# Patient Record
Sex: MALE | Race: WHITE
[De-identification: names, ages, dates, MRNs, and addresses within clinical notes are randomized per-mention and may not be internally consistent; named-entity substitution may affect disease eponyms.]

---

## 2018-02-28 ENCOUNTER — HOSPITAL ENCOUNTER (INPATIENT)
Dept: HOSPITAL 14 - H.ER | Age: 83
LOS: 2 days | Discharge: ANOTHER HOSPITAL | DRG: 836 | End: 2018-03-02
Attending: INTERNAL MEDICINE | Admitting: INTERNAL MEDICINE
Payer: MEDICARE

## 2018-02-28 DIAGNOSIS — E78.5: ICD-10-CM

## 2018-02-28 DIAGNOSIS — N40.0: ICD-10-CM

## 2018-02-28 DIAGNOSIS — E11.9: ICD-10-CM

## 2018-02-28 DIAGNOSIS — E78.00: ICD-10-CM

## 2018-02-28 DIAGNOSIS — Z95.5: ICD-10-CM

## 2018-02-28 DIAGNOSIS — Z79.82: ICD-10-CM

## 2018-02-28 DIAGNOSIS — D64.9: ICD-10-CM

## 2018-02-28 DIAGNOSIS — D69.6: ICD-10-CM

## 2018-02-28 DIAGNOSIS — Z79.84: ICD-10-CM

## 2018-02-28 DIAGNOSIS — C91.00: Primary | ICD-10-CM

## 2018-02-28 DIAGNOSIS — I25.10: ICD-10-CM

## 2018-02-28 DIAGNOSIS — I10: ICD-10-CM

## 2018-02-28 LAB
ACANTHOCYTES BLD QL SMEAR: SLIGHT
ALBUMIN SERPL-MCNC: 3.6 G/DL (ref 3.5–5)
ALBUMIN/GLOB SERPL: 1 {RATIO} (ref 1–2.1)
ALT SERPL-CCNC: 81 U/L (ref 21–72)
ANISOCYTOSIS BLD QL SMEAR: SLIGHT
APTT BLD: 28.3 SECONDS (ref 25.6–37.1)
AST SERPL-CCNC: 60 U/L (ref 17–59)
BASOPHILS # BLD AUTO: 0 K/UL (ref 0–0.2)
BASOPHILS # BLD AUTO: 0 K/UL (ref 0–0.2)
BASOPHILS NFR BLD: 0.5 % (ref 0–2)
BASOPHILS NFR BLD: 0.6 % (ref 0–2)
BILIRUB UR-MCNC: NEGATIVE MG/DL
BLASTS NFR BLD: 16 % (ref 0–0)
BUN SERPL-MCNC: 25 MG/DL (ref 9–20)
BURR CELLS BLD QL SMEAR: SLIGHT
CALCIUM SERPL-MCNC: 9.2 MG/DL (ref 8.4–10.2)
COLOR UR: (no result)
EOSINOPHIL # BLD AUTO: 0 K/UL (ref 0–0.7)
EOSINOPHIL # BLD AUTO: 0 K/UL (ref 0–0.7)
EOSINOPHIL NFR BLD: 0.1 % (ref 0–4)
EOSINOPHIL NFR BLD: 0.1 % (ref 0–4)
ERYTHROCYTE [DISTWIDTH] IN BLOOD BY AUTOMATED COUNT: 16.3 % (ref 11.5–14.5)
ERYTHROCYTE [DISTWIDTH] IN BLOOD BY AUTOMATED COUNT: 16.4 % (ref 11.5–14.5)
GFR NON-AFRICAN AMERICAN: > 60
GLUCOSE UR STRIP-MCNC: >=500 MG/DL
HGB BLD-MCNC: 11.7 G/DL (ref 12–18)
HGB BLD-MCNC: 11.8 G/DL (ref 12–18)
HYPERSEGMENTATION: PRESENT
HYPOCHROMIC: (no result)
INR PPP: 1.3 (ref 0.9–1.2)
LEUKOCYTE ESTERASE UR-ACNC: (no result) LEU/UL
LYMPHOCYTE: 46 % (ref 20–50)
LYMPHOCYTES # BLD AUTO: 2.9 K/UL (ref 1–4.3)
LYMPHOCYTES # BLD AUTO: 3.2 K/UL (ref 1–4.3)
LYMPHOCYTES NFR BLD AUTO: 46.6 % (ref 20–40)
LYMPHOCYTES NFR BLD AUTO: 50.3 % (ref 20–40)
MACROCYTES BLD QL SMEAR: SLIGHT
MCH RBC QN AUTO: 31.6 PG (ref 27–31)
MCH RBC QN AUTO: 32 PG (ref 27–31)
MCHC RBC AUTO-ENTMCNC: 33.3 G/DL (ref 33–37)
MCHC RBC AUTO-ENTMCNC: 33.5 G/DL (ref 33–37)
MCV RBC AUTO: 94.8 FL (ref 80–94)
MCV RBC AUTO: 95.3 FL (ref 80–94)
METAMYELOCYTES NFR BLD: 2 % (ref 0–0)
MONOCYTE: 1 % (ref 0–10)
MONOCYTES # BLD: 0.1 K/UL (ref 0–0.8)
MONOCYTES # BLD: 0.4 K/UL (ref 0–0.8)
MONOCYTES NFR BLD: 1 % (ref 0–10)
MONOCYTES NFR BLD: 6.7 % (ref 0–10)
MYELOCYTES NFR BLD: 2 % (ref 0–0)
NEUTROPHILS # BLD: 2.8 K/UL (ref 1.8–7)
NEUTROPHILS # BLD: 3.1 K/UL (ref 1.8–7)
NEUTROPHILS NFR BLD AUTO: 46 % (ref 50–75)
NEUTROPHILS NFR BLD AUTO: 48.1 % (ref 50–75)
NEUTROPHILS NFR BLD AUTO: 9 % (ref 42–75)
NRBC BLD AUTO-RTO: 3.6 % (ref 0–0)
NRBC BLD AUTO-RTO: 4.2 % (ref 0–0)
NRBC BLD AUTO-RTO: 7 % (ref 0–0)
OVALOCYTES BLD QL SMEAR: SLIGHT
PH UR STRIP: 5 [PH] (ref 5–8)
PLATELET # BLD EST: (no result) 10*3/UL
PLATELET # BLD: 22 K/UL (ref 130–400)
PLATELET # BLD: 23 K/UL (ref 130–400)
PMV BLD AUTO: 8.5 FL (ref 7.2–11.7)
PMV BLD AUTO: 9.2 FL (ref 7.2–11.7)
POIKILOCYTOSIS BLD QL SMEAR: SLIGHT
POLYCHROMIC: SLIGHT
PROMYELOCYTES NFR BLD: 4 % (ref 0–0)
PROT UR STRIP-MCNC: 30 MG/DL
PROTHROMBIN TIME: 14.8 SECONDS (ref 9.8–13.1)
RBC # BLD AUTO: 3.66 MIL/UL (ref 4.4–5.9)
RBC # BLD AUTO: 3.74 MIL/UL (ref 4.4–5.9)
RBC # UR STRIP: NEGATIVE /UL
SCHISTOCYTES BLD QL SMEAR: SLIGHT
SMUDGE CELLS # BLD: PRESENT 10*3/UL
SP GR UR STRIP: 1.03 (ref 1–1.03)
SPHEROCYTES BLD QL SMEAR: SLIGHT
STOMATOCYTES BLD QL SMEAR: SLIGHT
TARGETS BLD QL SMEAR: SLIGHT
TOTAL CELLS COUNTED BLD: 100
URINE CLARITY: (no result)
URINE NITRATE: NEGATIVE
UROBILINOGEN UR-MCNC: 2 MG/DL (ref 0.2–1)
VARIANT LYMPHS NFR BLD MANUAL: 20 % (ref 0–0)
WBC # BLD AUTO: 6.2 K/UL (ref 4.8–10.8)
WBC # BLD AUTO: 6.4 K/UL (ref 4.8–10.8)

## 2018-02-28 NOTE — RAD
HISTORY:

CP  



COMPARISON:

8/16/2013 



FINDINGS:



LUNGS:

No active pulmonary disease.



PLEURA:

No significant pleural effusion identified, no pneumothorax apparent.



CARDIOVASCULAR:

Normal.



OSSEOUS STRUCTURES:

No significant abnormalities.



VISUALIZED UPPER ABDOMEN:

Normal.



OTHER FINDINGS:

None.



IMPRESSION:

No active disease.

## 2018-02-28 NOTE — CT
PROCEDURE:  CT HEAD WITHOUT CONTRAST.



HISTORY:

Vertigo



COMPARISON:

Unenhanced head CT 03/19/2010.  Brain MRI without contrast 

12/27/2011. 



TECHNIQUE:

Axial computed tomography images were obtained through the head/brain 

without intravenous contrast.  



Radiation dose:



Total exam DLP = 850.76 mGy-cm.



This CT exam was performed using one or more of the following dose 

reduction techniques: Automated exposure control, adjustment of the 

mA and/or kV according to patient size, and/or use of iterative 

reconstruction technique.



FINDINGS:



HEMORRHAGE:

No intracranial hemorrhage. 



BRAIN:

Good corticomedullary differentiation is seen.  Diffuse expansion of 

the ventriculosulcal and cisternal spaces is appreciated with white 

matter lucency compatible with diffuse cerebral atrophy and chronic 

microangiopathy.  No suspicious extra-axial fluid collection is 

identified and the midline brain anatomy appears grossly nonfocal as 

imaged. There is no mass effect throughout.



VENTRICLES:

Unremarkable. No hydrocephalus. 



CALVARIUM:

Unremarkable.



PARANASAL SINUSES:

Extensive right mastoid sinus disease appreciated, acute superimposed 

on chronic.



MASTOID AIR CELLS:

Unremarkable as visualized. No inflammatory changes.



OTHER FINDINGS:

None.



IMPRESSION:

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 

acute CT findings intracranially. Follow-up CT or MRI are available 

as clinically warranted.

## 2018-02-28 NOTE — ED PDOC
HPI: General Adult


Time Seen by Provider: 02/28/18 12:02


Chief Complaint (Nursing): Flu-like Symptoms


Chief Complaint (Provider): Flu-like Symptoms


History Per: Patient


History/Exam Limitations: no limitations


Onset/Duration Of Symptoms: Persistent (x3 weeks)


Current Symptoms Are (Timing): Still Present


Additional Complaint(s): 





Yeyo Elkins is an 88 year old male that presents to the ED with a chief 

complaint of cough for three weeks, as well as generalized weakness, voice 

changing, and chest pain that she has been experiencing for the past five days. 

Patient reports that he has been taking over the counter medications without 

relief, and that he has been falling frequently because he feels weak. Patient 

denies any head injury, focal weakness, paresthesias, or headaches. 





Of Note: Patient's wife is in ER with similar symptoms. 





Past Medical History


Reviewed: Historical Data, Nursing Documentation, Vital Signs


Vital Signs: 


 Last Vital Signs











Temp  97.8 F   02/28/18 12:19


 


Pulse  86   02/28/18 12:19


 


Resp  20   02/28/18 12:19


 


BP  117/69   02/28/18 12:19


 


Pulse Ox  97   02/28/18 15:23














- Medical History


PMH: Benign Prostatic Hyperplasia, HTN, Hypercholesterolemia





- Surgical History


Surgical History: Coronary Stent





- Family History


Family History: States: Unknown Family Hx





- Home Medications


Home Medications: 


 Ambulatory Orders











 Medication  Instructions  Recorded


 


Aspirin [Ecotrin] 81 mg PO HS 02/28/18


 


Cholecalciferol (Vitamin D3) 5,000 unit PO DAILY 02/28/18





[Vitamin D3]  


 


Dorzolamide 2%/Timolol 0.5% 1 drop EACHEYE Q12 02/28/18





[Cosopt 2%-0.5% Opht]  


 


Finasteride [Proscar] 5 mg PO HS 02/28/18


 


Glipizide [Glipizide ER] 2.5 mg PO QPM 02/28/18


 


Glipizide [Glipizide Xl] 10 mg PO DAILY 02/28/18


 


Metformin ER [Glucophage XR] 750 mg PO DAILY 02/28/18


 


Metoprolol Succinate [Toprol XL] 25 mg PO HS 02/28/18


 


Omega-3 Fatty Acids [Omega-3] 1 cap PO DAILY 02/28/18


 


Propylene Glycol/Peg 400 [Systane 1 drop EACHEYE Q6 PRN 02/28/18





Ultra 0.4-0.3% Eye Drp]  


 


Ramipril [Altace] 5 mg PO HS 02/28/18


 


Rosuvastatin Calcium [Crestor] 5 mg PO HS 02/28/18


 


Tamsulosin [Flomax] 0.4 mg PO HS 02/28/18


 


Travoprost [Travatan Z] 1 drop EACHEYE HS 02/28/18


 


Vitamin B Complex Vit C No.4 1 tab PO DAILY 02/28/18





[Super B Complex]  














- Allergies


Allergies/Adverse Reactions: 


 Allergies











Allergy/AdvReac Type Severity Reaction Status Date / Time


 


No Known Allergies Allergy   Verified 02/28/18 12:27














Review of Systems


ROS Statement: Except As Marked, All Systems Reviewed And Found Negative


Constitutional: Positive for: Weakness (generalized)


ENT: Positive for: Other ("voicie changing")


Cardiovascular: Positive for: Chest Pain


Respiratory: Positive for: Cough





Physical Exam





- Reviewed


Nursing Documentation Reviewed: Yes


Vital Signs Reviewed: Yes





- Physical Exam


Appears: Positive for: Non-toxic, No Acute Distress


Head Exam: Positive for: ATRAUMATIC, NORMOCEPHALIC


Skin: Positive for: Normal Color, Warm


Eye Exam: Positive for: Normal appearance, EOMI, PERRL


ENT: Positive for: Pharynx Is (clear), Other (Uvula midline. Dry mucous 

membranes. Voice is hoarse.).  Negative for: Normal ENT Inspection, Pharyngeal 

Erythema, Tonsillar Exudate


Neck: Positive for: Normal, Supple


Cardiovascular/Chest: Positive for: Regular Rate, Rhythm.  Negative for: Murmur


Respiratory: Positive for: Normal Breath Sounds.  Negative for: Wheezing


Gastrointestinal/Abdominal: Positive for: Normal Exam, Soft.  Negative for: 

Tenderness


Back: Positive for: Normal Inspection.  Negative for: L CVA Tenderness, R CVA 

Tenderness


Extremity: Positive for: Normal ROM.  Negative for: Deformity, Swelling


Neurologic/Psych: Positive for: Alert, Oriented.  Negative for: Motor/Sensory 

Deficits





- Laboratory Results


Result Diagrams: 


 02/28/18 14:15





 02/28/18 12:45





- ECG


O2 Sat by Pulse Oximetry: 97 (RA)


Pulse Ox Interpretation: Normal





- Physician Consult Information


Time Consulting Physican Contacted: 15:03


Physician Contacted: Wendy Golden


Outcome Of Conversation: 





Recommends IgG antiplatelet Ab (direct and indirect), transfuse if evidence of 

bleeding. 





Medical Decision Making


Medical Decision Making: 





Impression: Influenza vs. Generalized Weakness vs. Pneumonia vs. Bronchitis vs. 

Viral Syndrome





Plan:


* CT Head w/o contrast


* EKG


* CMP


* CBC


* PTT


* PT


* Troponin I


* Urine Dip


* Urinalysis


* Blood Culture


* Glucose, Blood, POC


* Flu Swab


* Rapid Strep


* Reevaluation





Accession No. : [id number]


Patient Name / ID : JENNIFER CRANE  / [id number]


Exam Date : 02/28/2018 12:22:37 ( Approved )


Study Comment : 


Sex / Age : M  / 88Y





Creator : Johnny Salazar MD


Dictator : Johnny Salazar MD


 : 


Approver : Johnny Salazar MD


Approver2 : 





Report Date : 02/28/2018 12:36:44


My Comment : 


********************************************************************************

***





HISTORY:


CP  





COMPARISON:


8/16/2013 





FINDINGS:





LUNGS:


No active pulmonary disease.





PLEURA:


No significant pleural effusion identified, no pneumothorax apparent.





CARDIOVASCULAR:


Normal.





OSSEOUS STRUCTURES:


No significant abnormalities.





VISUALIZED UPPER ABDOMEN:


Normal.





OTHER FINDINGS:


None.





IMPRESSION:


No active disease.





Accession No. : [id number]


Patient Name / ID : JENNIFER CRANE  / [id number]


Exam Date : 02/28/2018 14:50:50 ( Approved )


Study Comment : 


Sex / Age : M  / 88Y





Creator : Cisco Naidu MD


Dictator : Cisco Naidu MD


 : 


Approver : Cisco Naidu MD


Approver2 : 





Report Date : 02/28/2018 15:34:07


My Comment : 


********************************************************************************

***





PROCEDURE:  CT HEAD WITHOUT CONTRAST.





HISTORY:


Vertigo





COMPARISON:


Unenhanced head CT 03/19/2010.  Brain MRI without contrast 12/27/2011. 





TECHNIQUE:


Axial computed tomography images were obtained through the head/brain without 

intravenous contrast.  





Radiation dose:





Total exam DLP = 850.76 mGy-cm.





This CT exam was performed using one or more of the following dose reduction 

techniques: Automated exposure control, adjustment of the mA and/or kV 

according to patient size, and/or use of iterative reconstruction technique.





FINDINGS:





HEMORRHAGE:


No intracranial hemorrhage. 





BRAIN:


Good corticomedullary differentiation is seen.  Diffuse expansion of the 

ventriculosulcal and cisternal spaces is appreciated with white matter lucency 

compatible with diffuse cerebral atrophy and chronic microangiopathy.  No 

suspicious extra-axial fluid collection is identified and the midline brain 

anatomy appears grossly nonfocal as imaged. There is no mass effect throughout.





VENTRICLES:


Unremarkable. No hydrocephalus. 





CALVARIUM:


Unremarkable.





PARANASAL SINUSES:


Extensive right mastoid sinus disease appreciated, acute superimposed on 

chronic.





MASTOID AIR CELLS:


Unremarkable as visualized. No inflammatory changes.





OTHER FINDINGS:


None.





IMPRESSION:


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 

findings intracranially. Follow-up CT or MRI are available as clinically 

warranted.





--------------------------------------------------------------------------------

----------------- 


Scribe Attestation:


Documented by Jennifer Barron, acting as a scribe for Luz Elena Dawkins MD.





Provider Scribe Attestation:


All medical record entries made by the Scribe were at my direction and 

personally dictated by me. I have reviewed the chart and agree that the record 

accurately reflects my personal performance of the history, physical exam, 

medical decision making, and the department course for this patient. I have 

also personally directed, reviewed, and agree with the discharge instructions 

and disposition.





Disposition





- Clinical Impression


Clinical Impression: 


 Chest pain, Thrombocytopenia








- Patient ED Disposition


Is Patient to be Admitted: Yes





- Disposition


Disposition Time: 15:22


Condition: STABLE





- Pt Status Changed To:


Hospital Disposition Of: Inpatient





- Admit Certification


Admit to Inpatient:: After my assessment, the patient will require 

hospitalization for at least two midnights.  This is because of the severity of 

symptoms shown, intensity of services needed, and/or the medical risk in this 

patient being treated as an outpatient.





- POA


Present On Arrival: None

## 2018-03-01 LAB
ALBUMIN SERPL-MCNC: 3.3 G/DL (ref 3.5–5)
ALBUMIN/GLOB SERPL: 1 {RATIO} (ref 1–2.1)
ALT SERPL-CCNC: 80 U/L (ref 21–72)
AST SERPL-CCNC: 51 U/L (ref 17–59)
BUN SERPL-MCNC: 27 MG/DL (ref 9–20)
CALCIUM SERPL-MCNC: 8.5 MG/DL (ref 8.4–10.2)
ERYTHROCYTE [DISTWIDTH] IN BLOOD BY AUTOMATED COUNT: 16.1 % (ref 11.5–14.5)
GFR NON-AFRICAN AMERICAN: > 60
HDLC SERPL-MCNC: 27 MG/DL (ref 30–70)
HGB BLD-MCNC: 10.3 G/DL (ref 12–18)
LDLC SERPL-MCNC: 36 MG/DL (ref 0–129)
MCH RBC QN AUTO: 31.6 PG (ref 27–31)
MCHC RBC AUTO-ENTMCNC: 33.4 G/DL (ref 33–37)
MCV RBC AUTO: 94.6 FL (ref 80–94)
PLATELET # BLD: 23 K/UL (ref 130–400)
RBC # BLD AUTO: 3.25 MIL/UL (ref 4.4–5.9)
T3: 1.2 NMOL/L (ref 1.49–2.6)
VIT B12 SERPL-MCNC: > 1000 PG/ML (ref 239–931)
WBC # BLD AUTO: 7.2 K/UL (ref 4.8–10.8)

## 2018-03-01 RX ADMIN — METOPROLOL SUCCINATE SCH MG: 25 TABLET, EXTENDED RELEASE ORAL at 21:22

## 2018-03-01 RX ADMIN — METOPROLOL SUCCINATE SCH MG: 25 TABLET, EXTENDED RELEASE ORAL at 03:11

## 2018-03-01 NOTE — CP.PCM.PN
Subjective





- Date & Time of Evaluation


Date of Evaluation: 03/01/18


Time of Evaluation: 12:55





- Subjective


Subjective: 





Spoke to Dr Roach from the leukemia department at [facility] , Pt has 

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 he has accepted the 

patient who will be transferred to [facility] as inpatient as soon as the 

bed is available. either today or tomorrow.





Objective





- Vital Signs/Intake and Output


Vital Signs (last 24 hours): 


 











Temp Pulse Resp BP Pulse Ox


 


 98.2 F   95 H  20   143/82   99 


 


 03/01/18 12:00  03/01/18 12:00  03/01/18 12:00  03/01/18 12:00  03/01/18 12:00











- Medications


Medications: 


 Current Medications





Aspirin (Ecotrin)  81 mg PO [facility]


   Last Admin: 02/28/18 23:00 Dose:  81 mg


Cholecalciferol (Vitamin D)  5,000 intlu PO DAILY [facility]


   Last Admin: 03/01/18 10:41 Dose:  5,000 intlu


Finasteride (Proscar)  5 mg PO [facility]


   Last Admin: 02/28/18 23:00 Dose:  5 mg


Glipizide (Glucotrol Xl)  2.5 mg PO QPM [facility]


Glipizide (Glucotrol Xl)  10 mg PO DAILY [facility]


   Last Admin: 03/01/18 10:38 Dose:  10 mg


Home Med (Dorzolamide 2%/Timolol 0.5% [Cosopt 2%-0.5% Opht])  1 drop EACHEYE 

Q12 [facility]


   Last Admin: 02/28/18 23:00 Dose:  1 drop


Home Med (Metformin Er [Glucophage Xr])  750 mg PO DAILY [facility]


   Last Admin: 03/01/18 10:39 Dose:  750 mg


Home Med (Patient's Own Medication)  1 unit PO DAILY [facility]


   Last Admin: 03/01/18 10:40 Dose:  1 unit


Home Med (Propylene Glycol/Peg 400 [Systane Ultra 0.4-0.3% Eye Drp])  1 drop 

EACHEYE Q6 PRN


   PRN Reason: Dry eyes


Home Med (Travoprost [Travatan Z])  1 drop EACHEYE [facility]


   Last Admin: 02/28/18 23:00 Dose:  1 drop


Home Med (Vitamin B Complex Vit C No.4 [Super B Complex])  1 tab PO DAILY [facility]


   Last Admin: 03/01/18 10:41 Dose:  1 tab


Home Med (Patient's Own Medication)  1 unit PO [facility]


   Last Admin: 02/28/18 23:00 Dose:  1 unit


Metoprolol Succinate (Toprol Xl)  25 mg PO [facility]


   Last Admin: 03/01/18 03:11 Dose:  25 mg


Ramipril (Altace)  5 mg PO [facility]


   Last Admin: 02/28/18 23:00 Dose:  5 mg


Tamsulosin HCl (Flomax)  0.4 mg PO [facility]


   Last Admin: 02/28/18 23:00 Dose:  0.4 mg











- Labs


Labs: 


 





 03/01/18 04:30 





 03/01/18 04:30 





 











PT  14.8 Seconds (9.8-13.1)  H  02/28/18  12:45    


 


INR  1.3  (0.9-1.2)  H  02/28/18  12:45    


 


APTT  28.3 Seconds (25.6-37.1)   02/28/18  12:45

## 2018-03-01 NOTE — CP.PCM.HP
History of Present Illness





- History of Present Illness


History of Present Illness: 





Patient evaluated with Dr Gonzales during rounds





89 y/o M with PMHx of DM, BPH was brought byfamily to ED yesterday with c/o 

progressive weakness for the past week, confusion and s/p recent fall with new 

onset big ecchymosis at the side of the impact from the fall. Patient has been 

compliant with all his meds. Family has been noticing confused at times, weak 

and with unsteady gait for the past 1-2 weeks, also hoarseness and sore throat. 

Today patient is in not acute distress in bed, seem slightly aphasic, c/o 

hoarseness and persistent generalized weakness. Denies hip pain, headache, CP, 

SOB, palpitations, vision changes or abd pain.





Present on Admission





- Present on Admission


Any Indicators Present on Admission: No





Review of Systems





- Review of Systems


All systems: reviewed and no additional remarkable complaints except





- Constitutional


Constitutional: Weakness





- EENT


Nose/Mouth/Throat: Hoarsness, Sore Throat





- Integumentary


Integumentary: Bleeding Lesions





Past Patient History





- Infectious Disease


Hx of Infectious Diseases: None





- Past Medical History & Family History


Past Medical History?: Yes





- Past Social History


Smoking Status: Never Smoked





- CARDIAC


Hx Cardiac Disorders: Yes


Hx Hypercholesterolemia: Yes


Hx Hypertension: Yes


Other/Comment: cardiac stents





- ENDOCRINE/METABOLIC


Hx Endocrine Disorders: Yes


Hx Diabetes Mellitus Type 2: Yes





- MUSCULOSKELETAL/RHEUMATOLOGICAL


Hx Falls: No





- GENITOURINARY/GYNECOLOGICAL


Hx Genitourinary Disorders: Yes


Other/Comment: BPH





- PSYCHIATRIC


Hx Substance Use: No





- SURGICAL HISTORY


Hx Coronary Stent: Yes





- ANESTHESIA


Hx Anesthesia: Yes


Hx Anesthesia Reactions: No





Meds


Allergies/Adverse Reactions: 


 Allergies











Allergy/AdvReac Type Severity Reaction Status Date / Time


 


No Known Allergies Allergy   Verified 02/28/18 12:27














Physical Exam





- Constitutional


Appears: Non-toxic, Chronically Ill





- Eye Exam


Eye Exam: EOMI, PERRL





- ENT Exam


ENT Exam: Mucous Membranes Moist





- Respiratory Exam


Respiratory Exam: Clear to Auscultation Bilateral, NORMAL BREATHING PATTERN.  

absent: Rales





- Cardiovascular Exam


Cardiovascular Exam: REGULAR RHYTHM, +S1, +S2.  absent: Gallop





- GI/Abdominal Exam


GI & Abdominal Exam: Normal Bowel Sounds, Soft.  absent: Tenderness





- Extremities Exam


Extremities exam: Negative for: calf tenderness


Additional comments: 





Spasticity B/L. 





- Neurological Exam


Neurological exam: Alert


Additional comments: 





mask like expression. LE spasticity B/L





- Skin


Skin Exam: Petechiae (LE. Ecchymosis R/hip), Warm





Results





- Vital Signs


Recent Vital Signs: 





 Last Vital Signs











Temp  99.0 F   03/01/18 05:39


 


Pulse  99 H  03/01/18 05:39


 


Resp  18   03/01/18 05:39


 


BP  123/66   03/01/18 05:39


 


Pulse Ox  100   03/01/18 05:39














- Labs


Result Diagrams: 


 03/01/18 04:30





 03/01/18 04:30


Labs: 





 Laboratory Results - last 24 hr











  02/28/18 02/28/18 02/28/18





  12:45 12:45 12:45


 


WBC  6.2  


 


RBC  3.74 L  


 


Hgb  11.8 L  


 


Hct  35.4  


 


MCV  94.8 H  


 


MCH  31.6 H  


 


MCHC  33.3  


 


RDW  16.4 H  


 


Plt Count  23 L*  


 


MPV  9.2  


 


Neut % (Auto)  46.0 L  


 


Lymph % (Auto)  46.6 H  


 


Mono % (Auto)  6.7  


 


Eos % (Auto)  0.1  


 


Baso % (Auto)  0.6  


 


Neut # (Auto)  2.8  


 


Lymph # (Auto)  2.9  


 


Mono # (Auto)  0.4  


 


Eos # (Auto)  0.0  


 


Baso # (Auto)  0.0  


 


Neutrophils % (Manual)   


 


Lymphocytes % (Manual)   


 


Reactive Lymphs %   


 


Monocytes % (Manual)   


 


Metamyelocytes %   


 


Myelocytes %   


 


Promyelocytes %   


 


Blast Cells %   


 


Nucleated RBC %   


 


Hypersegmented Polys   


 


Smudge Cells   


 


Platelet Estimate   


 


Polychromasia   


 


Hypochromasia (manual)   


 


Poikilocytosis (manual   


 


Anisocytosis (manual)   


 


Macrocytosis (manual)   


 


Spherocytes   


 


Target Cells   


 


Ovalocytes   


 


Stomatocytes   


 


Dejan Cells   


 


Acanthocytes (Spur)   


 


Schistocytes   


 


PT    14.8 H


 


INR    1.3 H


 


APTT    28.3


 


Sodium   143 


 


Potassium   4.2 


 


Chloride   109 H 


 


Carbon Dioxide   22 


 


Anion Gap   16 


 


BUN   25 H 


 


Creatinine   0.7 L 


 


Est GFR ( Amer)   > 60 


 


Est GFR (Non-Af Amer)   > 60 


 


POC Glucose (mg/dL)   


 


Random Glucose   304 H 


 


Calcium   9.2 


 


Total Bilirubin   0.9 


 


AST   60 H 


 


ALT   81 H 


 


Alkaline Phosphatase   1164 H 


 


Troponin I   0.0160 


 


Total Protein   7.1 


 


Albumin   3.6 


 


Globulin   3.5 


 


Albumin/Globulin Ratio   1.0 


 


Triglycerides   


 


Cholesterol   


 


LDL Cholesterol Direct   


 


HDL Cholesterol   


 


Vitamin B12   


 


Total T3   


 


TSH 3rd Generation   


 


Urine Color   


 


Urine Clarity   


 


Urine pH   


 


Ur Specific Gravity   


 


Urine Protein   


 


Urine Glucose (UA)   


 


Urine Ketones   


 


Urine Blood   


 


Urine Nitrate   


 


Urine Bilirubin   


 


Urine Urobilinogen   


 


Ur Leukocyte Esterase   


 


Urine RBC (Auto)   


 


Urine Microscopic WBC   


 


Influenza Typ A,B (EIA)   


 


Grp A Beta Strep Ag   














  02/28/18 02/28/18 02/28/18





  12:45 12:45 13:55


 


WBC   


 


RBC   


 


Hgb   


 


Hct   


 


MCV   


 


MCH   


 


MCHC   


 


RDW   


 


Plt Count   


 


MPV   


 


Neut % (Auto)   


 


Lymph % (Auto)   


 


Mono % (Auto)   


 


Eos % (Auto)   


 


Baso % (Auto)   


 


Neut # (Auto)   


 


Lymph # (Auto)   


 


Mono # (Auto)   


 


Eos # (Auto)   


 


Baso # (Auto)   


 


Neutrophils % (Manual)   


 


Lymphocytes % (Manual)   


 


Reactive Lymphs %   


 


Monocytes % (Manual)   


 


Metamyelocytes %   


 


Myelocytes %   


 


Promyelocytes %   


 


Blast Cells %   


 


Nucleated RBC %   


 


Hypersegmented Polys   


 


Smudge Cells   


 


Platelet Estimate   


 


Polychromasia   


 


Hypochromasia (manual)   


 


Poikilocytosis (manual   


 


Anisocytosis (manual)   


 


Macrocytosis (manual)   


 


Spherocytes   


 


Target Cells   


 


Ovalocytes   


 


Stomatocytes   


 


Dejan Cells   


 


Acanthocytes (Spur)   


 


Schistocytes   


 


PT   


 


INR   


 


APTT   


 


Sodium   


 


Potassium   


 


Chloride   


 


Carbon Dioxide   


 


Anion Gap   


 


BUN   


 


Creatinine   


 


Est GFR ( Amer)   


 


Est GFR (Non-Af Amer)   


 


POC Glucose (mg/dL)    263 H


 


Random Glucose   


 


Calcium   


 


Total Bilirubin   


 


AST   


 


ALT   


 


Alkaline Phosphatase   


 


Troponin I   


 


Total Protein   


 


Albumin   


 


Globulin   


 


Albumin/Globulin Ratio   


 


Triglycerides   


 


Cholesterol   


 


LDL Cholesterol Direct   


 


HDL Cholesterol   


 


Vitamin B12   


 


Total T3   


 


TSH 3rd Generation   


 


Urine Color   


 


Urine Clarity   


 


Urine pH   


 


Ur Specific Gravity   


 


Urine Protein   


 


Urine Glucose (UA)   


 


Urine Ketones   


 


Urine Blood   


 


Urine Nitrate   


 


Urine Bilirubin   


 


Urine Urobilinogen   


 


Ur Leukocyte Esterase   


 


Urine RBC (Auto)   


 


Urine Microscopic WBC   


 


Influenza Typ A,B (EIA)  Negative for flu a/b  


 


Grp A Beta Strep Ag   Negative 














  02/28/18 02/28/18 02/28/18





  14:15 14:15 21:00


 


WBC   6.4 


 


RBC   3.66 L 


 


Hgb   11.7 L 


 


Hct   34.9 L 


 


MCV   95.3 H 


 


MCH   32.0 H 


 


MCHC   33.5 


 


RDW   16.3 H 


 


Plt Count   22 L* 


 


MPV   8.5 


 


Neut % (Auto)   48.1 L 


 


Lymph % (Auto)   50.3 H 


 


Mono % (Auto)   1.0 


 


Eos % (Auto)   0.1 


 


Baso % (Auto)   0.5 


 


Neut # (Auto)   3.1 


 


Lymph # (Auto)   3.2 


 


Mono # (Auto)   0.1 


 


Eos # (Auto)   0.0 


 


Baso # (Auto)   0.0 


 


Neutrophils % (Manual)   9 L 


 


Lymphocytes % (Manual)   46 


 


Reactive Lymphs %   20 H 


 


Monocytes % (Manual)   1 


 


Metamyelocytes %   2 H 


 


Myelocytes %   2 H 


 


Promyelocytes %   4 H 


 


Blast Cells %   16 H 


 


Nucleated RBC %   7 H 


 


Hypersegmented Polys   Present 


 


Smudge Cells   Present 


 


Platelet Estimate   Decreased L 


 


Polychromasia   Slight 


 


Hypochromasia (manual)   Moderate 


 


Poikilocytosis (manual   Slight 


 


Anisocytosis (manual)   Slight 


 


Macrocytosis (manual)   Slight 


 


Spherocytes   Slight 


 


Target Cells   Slight 


 


Ovalocytes   Slight 


 


Stomatocytes   Slight 


 


Dejan Cells   Slight 


 


Acanthocytes (Spur)   Slight 


 


Schistocytes   Slight 


 


PT   


 


INR   


 


APTT   


 


Sodium   


 


Potassium   


 


Chloride   


 


Carbon Dioxide   


 


Anion Gap   


 


BUN   


 


Creatinine   


 


Est GFR ( Amer)   


 


Est GFR (Non-Af Amer)   


 


POC Glucose (mg/dL)   


 


Random Glucose   


 


Calcium   


 


Total Bilirubin   


 


AST   


 


ALT   


 


Alkaline Phosphatase   


 


Troponin I    0.0220


 


Total Protein   


 


Albumin   


 


Globulin   


 


Albumin/Globulin Ratio   


 


Triglycerides   


 


Cholesterol   


 


LDL Cholesterol Direct   


 


HDL Cholesterol   


 


Vitamin B12   


 


Total T3   


 


TSH 3rd Generation   


 


Urine Color  Leni  


 


Urine Clarity  Cloudy  


 


Urine pH  5.0  


 


Ur Specific Gravity  1.029  


 


Urine Protein  30  


 


Urine Glucose (UA)  >=500  


 


Urine Ketones  Trace  


 


Urine Blood  Negative  


 


Urine Nitrate  Negative  


 


Urine Bilirubin  Negative  


 


Urine Urobilinogen  2.0  


 


Ur Leukocyte Esterase  Neg  


 


Urine RBC (Auto)  4 H  


 


Urine Microscopic WBC  5  


 


Influenza Typ A,B (EIA)   


 


Grp A Beta Strep Ag   














  03/01/18 03/01/18





  04:30 04:30


 


WBC   7.2


 


RBC   3.25 L


 


Hgb   10.3 L


 


Hct   30.7 L


 


MCV   94.6 H


 


MCH   31.6 H


 


MCHC   33.4


 


RDW   16.1 H


 


Plt Count   23 L*


 


MPV  


 


Neut % (Auto)  


 


Lymph % (Auto)  


 


Mono % (Auto)  


 


Eos % (Auto)  


 


Baso % (Auto)  


 


Neut # (Auto)  


 


Lymph # (Auto)  


 


Mono # (Auto)  


 


Eos # (Auto)  


 


Baso # (Auto)  


 


Neutrophils % (Manual)  


 


Lymphocytes % (Manual)  


 


Reactive Lymphs %  


 


Monocytes % (Manual)  


 


Metamyelocytes %  


 


Myelocytes %  


 


Promyelocytes %  


 


Blast Cells %  


 


Nucleated RBC %  


 


Hypersegmented Polys  


 


Smudge Cells  


 


Platelet Estimate  


 


Polychromasia  


 


Hypochromasia (manual)  


 


Poikilocytosis (manual  


 


Anisocytosis (manual)  


 


Macrocytosis (manual)  


 


Spherocytes  


 


Target Cells  


 


Ovalocytes  


 


Stomatocytes  


 


Reading Cells  


 


Acanthocytes (Spur)  


 


Schistocytes  


 


PT  


 


INR  


 


APTT  


 


Sodium  144 


 


Potassium  3.8 


 


Chloride  108 H 


 


Carbon Dioxide  24 


 


Anion Gap  16 


 


BUN  27 H 


 


Creatinine  0.6 L 


 


Est GFR ( Amer)  > 60 


 


Est GFR (Non-Af Amer)  > 60 


 


POC Glucose (mg/dL)  


 


Random Glucose  229 H 


 


Calcium  8.5 


 


Total Bilirubin  0.8 


 


AST  51 


 


ALT  80 H 


 


Alkaline Phosphatase  1143 H 


 


Troponin I  0.0260 


 


Total Protein  6.6 


 


Albumin  3.3 L 


 


Globulin  3.3 


 


Albumin/Globulin Ratio  1.0 


 


Triglycerides  72 


 


Cholesterol  83 


 


LDL Cholesterol Direct  36 


 


HDL Cholesterol  27 L 


 


Vitamin B12  > 1000 H 


 


Total T3  1.20 L 


 


TSH 3rd Generation  1.00 


 


Urine Color  


 


Urine Clarity  


 


Urine pH  


 


Ur Specific Gravity  


 


Urine Protein  


 


Urine Glucose (UA)  


 


Urine Ketones  


 


Urine Blood  


 


Urine Nitrate  


 


Urine Bilirubin  


 


Urine Urobilinogen  


 


Ur Leukocyte Esterase  


 


Urine RBC (Auto)  


 


Urine Microscopic WBC  


 


Influenza Typ A,B (EIA)  


 


Grp A Beta Strep Ag  














Assessment & Plan





- Assessment and Plan (Free Text)


Assessment: 





thrombocytopenia/anemia


Plt 30s


Poss acute


Hemo-onc consulted: Poss ALL


Elevated AP with no so elevated LFTs


Big Ecchymosis R/Hip related to recent fall


F/U Hem-onc recs





Ataxia


New onset


Also LE spasticity


recent fall


Neuro consult





DM


Stable c/w home meds


Accuchecks

## 2018-03-01 NOTE — CP.PCM.CON
History of Present Illness





- History of Present Illness


History of Present Illness: 





This is a 88 yrs old male who came to the ER because of cough , for a week 

weakness, and then a sore throat and he lost his voice. He became very weak and 

had a fal with a large ecchymosis to the gluteal area. He came to the ER where 

a blood test done showed a WBC of 7.2,  hgb 10.3gms, and platelets were 22k. 

His manual differential showed neutrophils 9.1%, lymphocytes 46%, reactive 

lymphocytes 20%, monocytes 1%, metsmyelocytes 2%, promyelocytes 4%, blasts 16%, 

NRBC7%, smudge cells present. Chemistry showed AST 51, ALT 80, Alk phos 1143. 


He has no bleeding from any site. Only ecchymosis is from the fall and the 

blood drawing.


Pt is a little confused according to the wife, and especially since he lost his 

voice.


PMH; Hypercholesterolemia, HTN, DM type2,cardiac stent placed some yrs ago. 


He has never smoked drinks socially





Past Patient History





- Infectious Disease


Hx of Infectious Diseases: None





- Past Medical History & Family History


Past Medical History?: Yes





- Past Social History


Smoking Status: Never Smoked





- CARDIAC


Hx Cardiac Disorders: Yes


Hx Hypercholesterolemia: Yes


Hx Hypertension: Yes


Other/Comment: cardiac stents





- ENDOCRINE/METABOLIC


Hx Endocrine Disorders: Yes


Hx Diabetes Mellitus Type 2: Yes





- MUSCULOSKELETAL/RHEUMATOLOGICAL


Hx Falls: No





- GENITOURINARY/GYNECOLOGICAL


Hx Genitourinary Disorders: Yes


Other/Comment: BPH





- PSYCHIATRIC


Hx Substance Use: No





- SURGICAL HISTORY


Hx Coronary Stent: Yes





- ANESTHESIA


Hx Anesthesia: Yes


Hx Anesthesia Reactions: No





Meds


Allergies/Adverse Reactions: 


 Allergies











Allergy/AdvReac Type Severity Reaction Status Date / Time


 


No Known Allergies Allergy   Verified 02/28/18 12:27














- Medications


Medications: 


 Current Medications





Aspirin (Ecotrin)  81 mg PO HS [facility]


   Last Admin: 02/28/18 23:00 Dose:  81 mg


Cholecalciferol (Vitamin D)  5,000 intlu PO DAILY [facility]


Enoxaparin Sodium (Lovenox)  40 mg SC DAILY [facility]


   PRN Reason: Protocol


Finasteride (Proscar)  5 mg PO HS [facility]


   Last Admin: 02/28/18 23:00 Dose:  5 mg


Glipizide (Glucotrol Xl)  2.5 mg PO QPM [facility]


Glipizide (Glucotrol Xl)  10 mg PO DAILY [facility]


Home Med (Dorzolamide 2%/Timolol 0.5% [Cosopt 2%-0.5% Opht])  1 drop [facility] 

Q12 [facility]


   Last Admin: 02/28/18 23:00 Dose:  1 drop


Home Med (Metformin Er [Glucophage Xr])  750 mg PO DAILY [facility]


Home Med (Patient's Own Medication)  1 unit PO DAILY [facility]


Home Med (Propylene Glycol/Peg 400 [Systane Ultra 0.4-0.3% Eye Drp])  1 drop 

EACHEYE Q6 PRN


   PRN Reason: Dry eyes


Home Med (Travoprost [Travatan Z])  1 drop EACHEYE [facility]


   Last Admin: 02/28/18 23:00 Dose:  1 drop


Home Med (Vitamin B Complex Vit C No.4 [Super B Complex])  1 tab PO DAILY [facility]


Home Med (Patient's Own Medication)  1 unit PO [facility]


   Last Admin: 02/28/18 23:00 Dose:  1 unit


Metoprolol Succinate (Toprol Xl)  25 mg PO [facility]


   Last Admin: 03/01/18 03:11 Dose:  25 mg


Ramipril (Altace)  5 mg PO [facility]


   Last Admin: 02/28/18 23:00 Dose:  5 mg


Tamsulosin HCl (Flomax)  0.4 mg PO [facility]


   Last Admin: 02/28/18 23:00 Dose:  0.4 mg











Physical Exam





- Additional Findings


Additional findings: 





Physical exam; Alert , appears a little confused, in no acute distress


Neck; supple, no adenopathy


Chest; Clear, no rales or rhonchi


Heart: RSR, no murmur


Abd; Soft, no palpable hepato /splenomegaly


Ecchymosis seen on the right buttock





Results





- Vital Signs


Recent Vital Signs: 


 Last Vital Signs











Temp  98.5 F   03/01/18 08:00


 


Pulse  85   03/01/18 08:00


 


Resp  18   03/01/18 08:00


 


BP  106/68   03/01/18 08:00


 


Pulse Ox  99   03/01/18 08:00














- Labs


Result Diagrams: 


 03/01/18 04:30





 03/01/18 04:30


Labs: 


 Laboratory Results - last 24 hr











  02/28/18 02/28/18 02/28/18





  12:45 12:45 12:45


 


WBC  6.2  


 


RBC  3.74 L  


 


Hgb  11.8 L  


 


Hct  35.4  


 


MCV  94.8 H  


 


MCH  31.6 H  


 


MCHC  33.3  


 


RDW  16.4 H  


 


Plt Count  23 L*  


 


MPV  9.2  


 


Neut % (Auto)  46.0 L  


 


Lymph % (Auto)  46.6 H  


 


Mono % (Auto)  6.7  


 


Eos % (Auto)  0.1  


 


Baso % (Auto)  0.6  


 


Neut # (Auto)  2.8  


 


Lymph # (Auto)  2.9  


 


Mono # (Auto)  0.4  


 


Eos # (Auto)  0.0  


 


Baso # (Auto)  0.0  


 


Neutrophils % (Manual)   


 


Lymphocytes % (Manual)   


 


Reactive Lymphs %   


 


Monocytes % (Manual)   


 


Metamyelocytes %   


 


Myelocytes %   


 


Promyelocytes %   


 


Blast Cells %   


 


Nucleated RBC %   


 


Hypersegmented Polys   


 


Smudge Cells   


 


Platelet Estimate   


 


Polychromasia   


 


Hypochromasia (manual)   


 


Poikilocytosis (manual   


 


Anisocytosis (manual)   


 


Macrocytosis (manual)   


 


Spherocytes   


 


Target Cells   


 


Ovalocytes   


 


Stomatocytes   


 


[place] Cells   


 


Acanthocytes (Spur)   


 


Schistocytes   


 


PT    14.8 H


 


INR    1.3 H


 


APTT    28.3


 


Sodium   143 


 


Potassium   4.2 


 


Chloride   109 H 


 


Carbon Dioxide   22 


 


Anion Gap   16 


 


BUN   25 H 


 


Creatinine   0.7 L 


 


Est GFR ( Amer)   > 60 


 


Est GFR (Non-Af Amer)   > 60 


 


POC Glucose (mg/dL)   


 


Random Glucose   304 H 


 


Calcium   9.2 


 


Total Bilirubin   0.9 


 


AST   60 H 


 


ALT   81 H 


 


Alkaline Phosphatase   1164 H 


 


Troponin I   0.0160 


 


Total Protein   7.1 


 


Albumin   3.6 


 


Globulin   3.5 


 


Albumin/Globulin Ratio   1.0 


 


Triglycerides   


 


Cholesterol   


 


LDL Cholesterol Direct   


 


HDL Cholesterol   


 


Vitamin B12   


 


Total T3   


 


TSH 3rd Generation   


 


Urine Color   


 


Urine Clarity   


 


Urine pH   


 


Ur Specific Gravity   


 


Urine Protein   


 


Urine Glucose (UA)   


 


Urine Ketones   


 


Urine Blood   


 


Urine Nitrate   


 


Urine Bilirubin   


 


Urine Urobilinogen   


 


Ur Leukocyte Esterase   


 


Urine RBC (Auto)   


 


Urine Microscopic WBC   


 


Influenza Typ A,B (EIA)   


 


Grp A Beta Strep Ag   














  02/28/18 02/28/18 02/28/18





  12:45 12:45 13:55


 


WBC   


 


RBC   


 


Hgb   


 


Hct   


 


MCV   


 


MCH   


 


MCHC   


 


RDW   


 


Plt Count   


 


MPV   


 


Neut % (Auto)   


 


Lymph % (Auto)   


 


Mono % (Auto)   


 


Eos % (Auto)   


 


Baso % (Auto)   


 


Neut # (Auto)   


 


Lymph # (Auto)   


 


Mono # (Auto)   


 


Eos # (Auto)   


 


Baso # (Auto)   


 


Neutrophils % (Manual)   


 


Lymphocytes % (Manual)   


 


Reactive Lymphs %   


 


Monocytes % (Manual)   


 


Metamyelocytes %   


 


Myelocytes %   


 


Promyelocytes %   


 


Blast Cells %   


 


Nucleated RBC %   


 


Hypersegmented Polys   


 


Smudge Cells   


 


Platelet Estimate   


 


Polychromasia   


 


Hypochromasia (manual)   


 


Poikilocytosis (manual   


 


Anisocytosis (manual)   


 


Macrocytosis (manual)   


 


Spherocytes   


 


Target Cells   


 


Ovalocytes   


 


Stomatocytes   


 


[place] Cells   


 


Acanthocytes (Spur)   


 


Schistocytes   


 


PT   


 


INR   


 


APTT   


 


Sodium   


 


Potassium   


 


Chloride   


 


Carbon Dioxide   


 


Anion Gap   


 


BUN   


 


Creatinine   


 


Est GFR ( Amer)   


 


Est GFR (Non-Af Amer)   


 


POC Glucose (mg/dL)    263 H


 


Random Glucose   


 


Calcium   


 


Total Bilirubin   


 


AST   


 


ALT   


 


Alkaline Phosphatase   


 


Troponin I   


 


Total Protein   


 


Albumin   


 


Globulin   


 


Albumin/Globulin Ratio   


 


Triglycerides   


 


Cholesterol   


 


LDL Cholesterol Direct   


 


HDL Cholesterol   


 


Vitamin B12   


 


Total T3   


 


TSH 3rd Generation   


 


Urine Color   


 


Urine Clarity   


 


Urine pH   


 


Ur Specific Gravity   


 


Urine Protein   


 


Urine Glucose (UA)   


 


Urine Ketones   


 


Urine Blood   


 


Urine Nitrate   


 


Urine Bilirubin   


 


Urine Urobilinogen   


 


Ur Leukocyte Esterase   


 


Urine RBC (Auto)   


 


Urine Microscopic WBC   


 


Influenza Typ A,B (EIA)  Negative for flu a/b  


 


Grp A Beta Strep Ag   Negative 














  02/28/18 02/28/18 02/28/18





  14:15 14:15 21:00


 


WBC   6.4 


 


RBC   3.66 L 


 


Hgb   11.7 L 


 


Hct   34.9 L 


 


MCV   95.3 H 


 


MCH   32.0 H 


 


MCHC   33.5 


 


RDW   16.3 H 


 


Plt Count   22 L* 


 


MPV   8.5 


 


Neut % (Auto)   48.1 L 


 


Lymph % (Auto)   50.3 H 


 


Mono % (Auto)   1.0 


 


Eos % (Auto)   0.1 


 


Baso % (Auto)   0.5 


 


Neut # (Auto)   3.1 


 


Lymph # (Auto)   3.2 


 


Mono # (Auto)   0.1 


 


Eos # (Auto)   0.0 


 


Baso # (Auto)   0.0 


 


Neutrophils % (Manual)   9 L 


 


Lymphocytes % (Manual)   46 


 


Reactive Lymphs %   20 H 


 


Monocytes % (Manual)   1 


 


Metamyelocytes %   2 H 


 


Myelocytes %   2 H 


 


Promyelocytes %   4 H 


 


Blast Cells %   16 H 


 


Nucleated RBC %   7 H 


 


Hypersegmented Polys   Present 


 


Smudge Cells   Present 


 


Platelet Estimate   Decreased L 


 


Polychromasia   Slight 


 


Hypochromasia (manual)   Moderate 


 


Poikilocytosis (manual   Slight 


 


Anisocytosis (manual)   Slight 


 


Macrocytosis (manual)   Slight 


 


Spherocytes   Slight 


 


Target Cells   Slight 


 


Ovalocytes   Slight 


 


Stomatocytes   Slight 


 


[place] Cells   Slight 


 


Acanthocytes (Spur)   Slight 


 


Schistocytes   Slight 


 


PT   


 


INR   


 


APTT   


 


Sodium   


 


Potassium   


 


Chloride   


 


Carbon Dioxide   


 


Anion Gap   


 


BUN   


 


Creatinine   


 


Est GFR ( Amer)   


 


Est GFR (Non-Af Amer)   


 


POC Glucose (mg/dL)   


 


Random Glucose   


 


Calcium   


 


Total Bilirubin   


 


AST   


 


ALT   


 


Alkaline Phosphatase   


 


Troponin I    0.0220


 


Total Protein   


 


Albumin   


 


Globulin   


 


Albumin/Globulin Ratio   


 


Triglycerides   


 


Cholesterol   


 


LDL Cholesterol Direct   


 


HDL Cholesterol   


 


Vitamin B12   


 


Total T3   


 


TSH 3rd Generation   


 


Urine Color  Leni  


 


Urine Clarity  Cloudy  


 


Urine pH  5.0  


 


Ur Specific Gravity  1.029  


 


Urine Protein  30  


 


Urine Glucose (UA)  >=500  


 


Urine Ketones  Trace  


 


Urine Blood  Negative  


 


Urine Nitrate  Negative  


 


Urine Bilirubin  Negative  


 


Urine Urobilinogen  2.0  


 


Ur Leukocyte Esterase  Neg  


 


Urine RBC (Auto)  4 H  


 


Urine Microscopic WBC  5  


 


Influenza Typ A,B (EIA)   


 


Grp A Beta Strep Ag   














  03/01/18 03/01/18





  04:30 04:30


 


WBC   7.2


 


RBC   3.25 L


 


Hgb   10.3 L


 


Hct   30.7 L


 


MCV   94.6 H


 


MCH   31.6 H


 


MCHC   33.4


 


RDW   16.1 H


 


Plt Count   23 L*


 


MPV  


 


Neut % (Auto)  


 


Lymph % (Auto)  


 


Mono % (Auto)  


 


Eos % (Auto)  


 


Baso % (Auto)  


 


Neut # (Auto)  


 


Lymph # (Auto)  


 


Mono # (Auto)  


 


Eos # (Auto)  


 


Baso # (Auto)  


 


Neutrophils % (Manual)  


 


Lymphocytes % (Manual)  


 


Reactive Lymphs %  


 


Monocytes % (Manual)  


 


Metamyelocytes %  


 


Myelocytes %  


 


Promyelocytes %  


 


Blast Cells %  


 


Nucleated RBC %  


 


Hypersegmented Polys  


 


Smudge Cells  


 


Platelet Estimate  


 


Polychromasia  


 


Hypochromasia (manual)  


 


Poikilocytosis (manual  


 


Anisocytosis (manual)  


 


Macrocytosis (manual)  


 


Spherocytes  


 


Target Cells  


 


Ovalocytes  


 


Stomatocytes  


 


[place] Cells  


 


Acanthocytes (Spur)  


 


Schistocytes  


 


PT  


 


INR  


 


APTT  


 


Sodium  144 


 


Potassium  3.8 


 


Chloride  108 H 


 


Carbon Dioxide  24 


 


Anion Gap  16 


 


BUN  27 H 


 


Creatinine  0.6 L 


 


Est GFR ( Amer)  > 60 


 


Est GFR (Non-Af Amer)  > 60 


 


POC Glucose (mg/dL)  


 


Random Glucose  229 H 


 


Calcium  8.5 


 


Total Bilirubin  0.8 


 


AST  51 


 


ALT  80 H 


 


Alkaline Phosphatase  1143 H 


 


Troponin I  0.0260 


 


Total Protein  6.6 


 


Albumin  3.3 L 


 


Globulin  3.3 


 


Albumin/Globulin Ratio  1.0 


 


Triglycerides  72 


 


Cholesterol  83 


 


LDL Cholesterol Direct  36 


 


HDL Cholesterol  27 L 


 


Vitamin B12  > 1000 H 


 


Total T3  1.20 L 


 


TSH 3rd Generation  1.00 


 


Urine Color  


 


Urine Clarity  


 


Urine pH  


 


Ur Specific Gravity  


 


Urine Protein  


 


Urine Glucose (UA)  


 


Urine Ketones  


 


Urine Blood  


 


Urine Nitrate  


 


Urine Bilirubin  


 


Urine Urobilinogen  


 


Ur Leukocyte Esterase  


 


Urine RBC (Auto)  


 


Urine Microscopic WBC  


 


Influenza Typ A,B (EIA)  


 


Grp A Beta Strep Ag  














Assessment & Plan





- Assessment and Plan (Free Text)


Assessment: 





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


Plan: 





Plan; Will discuss with  pathologist Dr Tesfaye before calling [facility] c[facility].





- Date & Time


Date: 03/01/18


Time: 10:56

## 2018-03-01 NOTE — CON
DATE:



HISTORY OF PRESENT ILLNESS:  Mr. Elkins is an 88-year-old male who was

admitted via the Emergency Room and referred for pulmonary consultation by

Dr. Gonzales.  He is well known to me from being the patient in my office for

multiple years, but recently had changes in his insurance and has been

unable to come.  According to the wife, he felt sick for the past several

weeks and became worse over the past 24 hours and was brought to the

Emergency Room where workup is ongoing.  He has had generalized weakness,

change in his voice and caliber for the past several days and he has also

had chest discomfort for the past 5 days.  His speech is poor and his

memory is also poor and he has become more incoherent.



PAST MEDICAL HISTORY:  Benign prostatic hyperplasia, hypertension,

hyperlipidemia, and diabetes mellitus.



FAMILY HISTORY:  Noncontributory.



SOCIAL HISTORY:  He does not smoke or drink, and lives at home with his

wife.



REVIEW OF SYSTEMS:  Essentially remarkable for new onset of unsteadiness of

gait.



PHYSICAL EXAMINATION:

VITAL SIGNS:  Blood pressure 117/69 with the pulse of 86, respiratory rate

of 20.  He is afebrile.  O2 sat is 97% on room air.

SKIN:  Shows fair turgor.

HEENT:  Pupils are equal and reactive to light and accommodation.  Mouth

shows fair hygiene.  JVP flat.

LUNGS:  Shows fair aeration with some basilar rales.

HEART:  S1 and S2.

ABDOMEN:  Soft and nontender.  No organomegaly.

EXTREMITIES:  Shows no edema or cyanosis.

CENTRAL NERVOUS SYSTEM:  The patient appears somewhat confused.  No gross

deficit except for that.



LABORATORY DATA:  WBC of 6.4, hemoglobin of 11.7, and platelet count of

22,000.  Sodium 143, potassium 4.2, BUN 25, creatinine 0.7, and serum

glucose 304.  CT scan of the head remarkable for age related _____ changes.

Chest x-ray, no active disease.  EKG is remarkable for sinus rhythm with

premature atrial complexes and left axis deviation.



IMPRESSION:  This is an 88-year-old male with multiple medical problems who

has some form of change in mental status, unsteadiness of gait, and

thrombocytopenia with anemia.  One has to rule out some form of neurologic

pathology.  One also has rule out some form of hematopoietic or hematologic

pathology review due to severe thrombocytopenia including leukemia, history

of coronary artery disease in the past, history of diabetes mellitus as

poorly controlled, and history of hypertension.









PLAN:  Plan is to continue therapy as ordered.  I agree with

hematology/oncology evaluation.  We will continue to follow with you. 

Further therapy will depend on findings.





__________________________________________

Brandon Horton MD





DD:  03/01/2018 9:15:01

DT:  03/01/2018 11:42:28

Job # [id number]

## 2018-03-02 VITALS — TEMPERATURE: 98.4 F | HEART RATE: 97 BPM | DIASTOLIC BLOOD PRESSURE: 74 MMHG | SYSTOLIC BLOOD PRESSURE: 110 MMHG

## 2018-03-02 VITALS — OXYGEN SATURATION: 98 % | RESPIRATION RATE: 18 BRPM

## 2018-03-02 NOTE — CON
CARDIOLOGY CONSULTATION



DATE:



REASON FOR CONSULTATION:  History of _____ EKG.



HISTORY OF PRESENT ILLNESS:  The patient is an 88-year-old  male

who was brought in to the Emergency Room because of cough for the past 3

weeks as well as generalized weakness and voice changing as well as chest

pain.  At this time, the patient himself does not provide me with any

reliable answers, he is confused and I relied on the history from the

Emergency Room evaluation.



SOCIAL HISTORY:  The patient is , lives with his wife.



MEDICATIONS:  Altace 5 mg at bedtime, aspirin 81 mg once a day, Flomax 0.4

mg once a day, Glucotrol XL 10 mg daily, Proscar 5 mg once a day, Lopressor

25 mg once a day and vitamin D 5,000 units daily.



REVIEW OF SYSTEMS:  No reported hypotension.  No reported ventricular

arrhythmia, low-grade fever was reported.



PHYSICAL EXAMINATION

GENERAL:  The patient is an elderly male who is confused, does not appear

to be in any respiratory distress.

VITAL SIGNS:  Blood pressure 143/82, heart rate 95, temperature 98.2 and

respirations 20.

HEENT:  Pale conjunctivae.

CHEST:  Diminished breath sounds bilaterally.

HEART:  S1 and S2 regular and distant.

ABDOMEN:  Soft.

EXTREMITIES:  No edema.  Bilateral ecchymosis noted on the both forearms.



LABORATORY DATA:  Hemoglobin and hematocrit 10.3 and 30.7, white count 7.2

and platelet count 23,000.  Urinalysis revealed 4 rbcs per high-power

field.  SMA-7; sodium 144, potassium 3.8, chloride 108, CO2 of 24, glucose

129, BUN 27 and creatinine 0.6.  INR is 1.3, PTT 28.3 and PT 14.8. 

Influenza type A and B serology is negative.  EKG revealed sinus

arrhythmia, premature atrial complexes, left-axis deviation.  Head CT scan

without contrast, age-related neurogenic changes _____ acute CT findings,

intracranially.  I did review Dr. Golden's the Hematology consultant note. 

Dr. Golden spoke to Dr. Avila from the Leukemia department, [place]. 

The patient has acute lymphoid leukemia, which cannot be treated at [facility]

and Dr. Avila will accept the transfer to [place] as soon as the bed

is available.



ASSESSMENT:

1.  Abnormal electrocardiogram with evidence of sinus arrhythmia, left-axis

deviation.

2.  Chest pain, myocardial infarction is ruled out.

3.  Acute lymphocytic leukemia.

4.  Thrombocytopenia.



RECOMMENDATIONS:  Continue current Altace at 5 mg once a day, hold aspirin,

continue Toprol XL 25 mg once a day.  The patient will be transferred to

[facility] where further cardiac workup will be completed.  No

specific cardiac intervention is needed at this time.







__________________________________________

Chris Dodson MD





DD:  03/01/2018 14:16:47

DT:  03/01/2018 19:33:20

Job # [id number]

## 2018-03-02 NOTE — CARD
--------------- APPROVED REPORT --------------





EKG Measurement

Heart [id number]

NE [id number]

[id number]

[id number]

[id number]



<Conclusion>

Sinus rhythm with premature atrial complexes

Left axis deviation

Abnormal ECG

## 2023-08-14 NOTE — PN
DATE:  03/02/2018



SUBJECTIVE:  The patient is seen and examined.  Interim events noted. 

Consults noted and appreciated.  Pulmonary and Hematology/Oncology

interventions noted and appreciated.  Case was discussed with the

hematologist.  The patient was found to have acute lymphocytic leukemia and

is being transferred to [place] for further treatment.



PHYSICAL EXAMINATION:

GENERAL:  The patient is in no acute distress.

VITAL SIGNS:  Stable.

HEART:  S1 and S2, normal and regular.

LUNGS:  Good bilateral air exchange.

ABDOMEN:  Soft and nontender.

EXTREMITIES:  No edema.  No calf swelling.  No tenderness.  No acute

ischemia.

CNS:  Essentially unchanged.



DIAGNOSTIC DATA:  Available diagnostic data reviewed.  Overall, the patient

is hemodynamically stable, although long-term prognosis remains guarded

depending _____ to treatment for acute lymphocytic leukemia.



PLAN:  Plan as ordered.  The patient is being transferred to [place].





__________________________________________

Rommel Gonzales MD





DD:  03/02/2018 10:15:36

DT:  03/02/2018 10:20:11

Job # [id number] Last filled 06/09/23  Last ov 08/08/23  F/u 10/04/23